# Patient Record
Sex: FEMALE | Race: WHITE | NOT HISPANIC OR LATINO | URBAN - METROPOLITAN AREA
[De-identification: names, ages, dates, MRNs, and addresses within clinical notes are randomized per-mention and may not be internally consistent; named-entity substitution may affect disease eponyms.]

---

## 2019-02-06 ENCOUNTER — OFFICE VISIT (OUTPATIENT)
Dept: URBAN - METROPOLITAN AREA CLINIC 62 | Facility: CLINIC | Age: 79
End: 2019-02-06
Payer: MEDICARE

## 2019-02-06 DIAGNOSIS — Z96.1 PRESENCE OF INTRAOCULAR LENS: ICD-10-CM

## 2019-02-06 DIAGNOSIS — H26.493 OTHER SECONDARY CATARACT, BILATERAL: ICD-10-CM

## 2019-02-06 PROCEDURE — 92134 CPTRZ OPH DX IMG PST SGM RTA: CPT | Performed by: OPHTHALMOLOGY

## 2019-02-06 PROCEDURE — 92014 COMPRE OPH EXAM EST PT 1/>: CPT | Performed by: OPHTHALMOLOGY

## 2019-02-06 ASSESSMENT — VISUAL ACUITY
OS: 20/25
OD: 20/20

## 2019-02-06 ASSESSMENT — INTRAOCULAR PRESSURE
OD: 11
OS: 11

## 2019-02-06 NOTE — IMPRESSION/PLAN
Impression: Other secondary cataract, bilateral: H26.493. Plan: Discussed diagnosis in detail with patient. No treatment is required at this time. Will continue to observe condition and or symptoms. Call if [address] worsens.

## 2019-02-06 NOTE — IMPRESSION/PLAN
Impression: Rheumatoid arthritis w/o rheumatoid factor, multiple sites: M06.09. Plan: Discussed diagnosis in detail with patient. No toxicity noted.

## 2019-02-06 NOTE — IMPRESSION/PLAN
Impression: Dry eye syndrome of bilateral lacrimal glands: H04.123. Plan: Dry eyes account for the patient's complaints. There is no evidence of permanent changes to the cornea. Explained condition does not have a cure and will need artificial tears for maintenance. if not better consider Restasis.

## 2020-06-18 ENCOUNTER — OFFICE VISIT (OUTPATIENT)
Dept: URBAN - METROPOLITAN AREA CLINIC 62 | Facility: CLINIC | Age: 80
End: 2020-06-18
Payer: MEDICARE

## 2020-06-18 DIAGNOSIS — M06.09 RHEUMATOID ARTHRITIS W/O RHEUMATOID FACTOR, MULTIPLE SITES: Primary | ICD-10-CM

## 2020-06-18 DIAGNOSIS — Z79.899 OTHER LONG TERM (CURRENT) DRUG THERAPY: ICD-10-CM

## 2020-06-18 PROCEDURE — 92134 CPTRZ OPH DX IMG PST SGM RTA: CPT | Performed by: OPHTHALMOLOGY

## 2020-06-18 PROCEDURE — 92014 COMPRE OPH EXAM EST PT 1/>: CPT | Performed by: OPHTHALMOLOGY

## 2020-06-18 ASSESSMENT — KERATOMETRY
OS: 45.13
OD: 45.00

## 2020-06-18 ASSESSMENT — VISUAL ACUITY
OS: 20/25
OD: 20/25

## 2020-06-18 NOTE — IMPRESSION/PLAN
Impression: Rheumatoid arthritis w/o rheumatoid factor, multiple sites: M06.09. Plan: Discussed diagnosis in detail with patient. No toxicity noted. OCT NML, reviewed with patient. Cont with current medication.

## 2021-10-01 ENCOUNTER — OFFICE VISIT (OUTPATIENT)
Dept: URBAN - METROPOLITAN AREA CLINIC 63 | Facility: CLINIC | Age: 81
End: 2021-10-01
Payer: MEDICARE

## 2021-10-01 DIAGNOSIS — H04.123 DRY EYE SYNDROME OF BILATERAL LACRIMAL GLANDS: ICD-10-CM

## 2021-10-01 DIAGNOSIS — T37.2X5D ADVERSE EFFECT OF ANTIMALARIALS AND DRUGS ACTING ON OTHER BLOOD PROTOZOA, SUBSEQUENT ENCOUNTER: ICD-10-CM

## 2021-10-01 PROCEDURE — 92134 CPTRZ OPH DX IMG PST SGM RTA: CPT | Performed by: OPHTHALMOLOGY

## 2021-10-01 PROCEDURE — 99213 OFFICE O/P EST LOW 20 MIN: CPT | Performed by: OPHTHALMOLOGY

## 2021-10-01 ASSESSMENT — INTRAOCULAR PRESSURE
OS: 12
OD: 12

## 2021-10-01 ASSESSMENT — VISUAL ACUITY
OD: 20/20
OS: 20/25

## 2021-10-01 ASSESSMENT — KERATOMETRY
OS: 44.75
OD: 44.75

## 2021-10-01 NOTE — IMPRESSION/PLAN
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  Plan: See plan #1

## 2023-07-21 ENCOUNTER — OFFICE VISIT (OUTPATIENT)
Dept: URBAN - METROPOLITAN AREA CLINIC 63 | Facility: CLINIC | Age: 83
End: 2023-07-21
Payer: MEDICARE

## 2023-07-21 DIAGNOSIS — T37.2X5D ADVERSE EFFECT OF ANTIMALARIALS AND DRUGS ACTING ON OTHER BLOOD PROTOZOA, SUBSEQUENT ENCOUNTER: ICD-10-CM

## 2023-07-21 DIAGNOSIS — Z79.899 OTHER LONG TERM (CURRENT) DRUG THERAPY: Primary | ICD-10-CM

## 2023-07-21 DIAGNOSIS — H26.493 OTHER SECONDARY CATARACT, BILATERAL: ICD-10-CM

## 2023-07-21 DIAGNOSIS — M06.09 RHEUMATOID ARTHRITIS W/O RHEUMATOID FACTOR, MULTIPLE SITES: ICD-10-CM

## 2023-07-21 PROCEDURE — 92134 CPTRZ OPH DX IMG PST SGM RTA: CPT | Performed by: OPHTHALMOLOGY

## 2023-07-21 PROCEDURE — 92014 COMPRE OPH EXAM EST PT 1/>: CPT | Performed by: OPHTHALMOLOGY

## 2023-07-21 ASSESSMENT — VISUAL ACUITY
OS: 20/30
OD: 20/20

## 2023-07-21 ASSESSMENT — INTRAOCULAR PRESSURE
OS: 10
OD: 10

## 2023-07-21 ASSESSMENT — KERATOMETRY
OS: 45.00
OD: 44.75

## 2023-07-21 NOTE — IMPRESSION/PLAN
Impression: Other long term (current) drug therapy: Z79.899. Plan: No maculopathy noted. OCT normal.  Rec. eval every 6 to 12 months for dilated exam, color vision and OCT macula. To call ASAP with vision change.

## 2024-08-05 ENCOUNTER — OFFICE VISIT (OUTPATIENT)
Dept: URBAN - METROPOLITAN AREA CLINIC 63 | Facility: CLINIC | Age: 84
End: 2024-08-05
Payer: MEDICARE

## 2024-08-05 DIAGNOSIS — Z79.899 OTHER LONG TERM (CURRENT) DRUG THERAPY: ICD-10-CM

## 2024-08-05 DIAGNOSIS — H26.493 OTHER SECONDARY CATARACT, BILATERAL: ICD-10-CM

## 2024-08-05 DIAGNOSIS — M06.09 RHEUMATOID ARTHRITIS W/O RHEUMATOID FACTOR, MULTIPLE SITES: Primary | ICD-10-CM

## 2024-08-05 DIAGNOSIS — T37.2X5D ADVERSE EFFECT OF ANTIMALARIALS AND DRUGS ACTING ON OTHER BLOOD PROTOZOA, SUBSEQUENT ENCOUNTER: ICD-10-CM

## 2024-08-05 PROCEDURE — 92134 CPTRZ OPH DX IMG PST SGM RTA: CPT | Performed by: OPHTHALMOLOGY

## 2024-08-05 PROCEDURE — 92014 COMPRE OPH EXAM EST PT 1/>: CPT | Performed by: OPHTHALMOLOGY

## 2024-08-05 ASSESSMENT — INTRAOCULAR PRESSURE
OS: 11
OD: 11

## 2024-08-05 ASSESSMENT — KERATOMETRY
OS: 44.63
OD: 45.13

## 2024-08-05 ASSESSMENT — VISUAL ACUITY
OD: 20/20
OS: 20/20

## 2025-08-18 ENCOUNTER — OFFICE VISIT (OUTPATIENT)
Dept: URBAN - METROPOLITAN AREA CLINIC 63 | Facility: CLINIC | Age: 85
End: 2025-08-18
Payer: MEDICARE

## 2025-08-18 DIAGNOSIS — H04.123 DRY EYE SYNDROME OF BILATERAL LACRIMAL GLANDS: ICD-10-CM

## 2025-08-18 DIAGNOSIS — Z79.899 OTHER LONG TERM (CURRENT) DRUG THERAPY: ICD-10-CM

## 2025-08-18 DIAGNOSIS — H26.493 OTHER SECONDARY CATARACT, BILATERAL: ICD-10-CM

## 2025-08-18 DIAGNOSIS — M06.09 RHEUMATOID ARTHRITIS W/O RHEUMATOID FACTOR, MULTIPLE SITES: Primary | ICD-10-CM

## 2025-08-18 PROCEDURE — 92134 CPTRZ OPH DX IMG PST SGM RTA: CPT | Performed by: OPHTHALMOLOGY

## 2025-08-18 PROCEDURE — 92014 COMPRE OPH EXAM EST PT 1/>: CPT | Performed by: OPHTHALMOLOGY

## 2025-08-18 ASSESSMENT — INTRAOCULAR PRESSURE
OD: 13
OS: 12

## 2025-08-18 ASSESSMENT — VISUAL ACUITY
OS: 20/30
OD: 20/30